# Patient Record
Sex: FEMALE | Race: WHITE | NOT HISPANIC OR LATINO
[De-identification: names, ages, dates, MRNs, and addresses within clinical notes are randomized per-mention and may not be internally consistent; named-entity substitution may affect disease eponyms.]

---

## 2019-04-19 ENCOUNTER — APPOINTMENT (OUTPATIENT)
Dept: ANTEPARTUM | Facility: CLINIC | Age: 29
End: 2019-04-19
Payer: COMMERCIAL

## 2019-04-19 PROBLEM — Z00.00 ENCOUNTER FOR PREVENTIVE HEALTH EXAMINATION: Status: ACTIVE | Noted: 2019-04-19

## 2019-04-19 PROCEDURE — 36415 COLL VENOUS BLD VENIPUNCTURE: CPT

## 2019-04-19 PROCEDURE — 76801 OB US < 14 WKS SINGLE FETUS: CPT

## 2019-04-19 PROCEDURE — 76813 OB US NUCHAL MEAS 1 GEST: CPT

## 2019-06-11 ENCOUNTER — APPOINTMENT (OUTPATIENT)
Dept: ANTEPARTUM | Facility: CLINIC | Age: 29
End: 2019-06-11
Payer: COMMERCIAL

## 2019-06-11 PROCEDURE — 76805 OB US >/= 14 WKS SNGL FETUS: CPT

## 2019-06-11 PROCEDURE — 76817 TRANSVAGINAL US OBSTETRIC: CPT

## 2019-09-03 ENCOUNTER — APPOINTMENT (OUTPATIENT)
Dept: ANTEPARTUM | Facility: CLINIC | Age: 29
End: 2019-09-03
Payer: COMMERCIAL

## 2019-09-03 PROCEDURE — 76819 FETAL BIOPHYS PROFIL W/O NST: CPT

## 2019-09-03 PROCEDURE — 76817 TRANSVAGINAL US OBSTETRIC: CPT

## 2019-09-03 PROCEDURE — 76816 OB US FOLLOW-UP PER FETUS: CPT

## 2019-10-30 ENCOUNTER — INPATIENT (INPATIENT)
Facility: HOSPITAL | Age: 29
LOS: 1 days | Discharge: HOME | End: 2019-11-01
Attending: OBSTETRICS & GYNECOLOGY | Admitting: OBSTETRICS & GYNECOLOGY
Payer: COMMERCIAL

## 2019-10-30 VITALS — WEIGHT: 152.12 LBS | HEIGHT: 66 IN

## 2019-10-30 LAB
ALBUMIN SERPL ELPH-MCNC: 2.8 G/DL — LOW (ref 3.3–5)
ALP SERPL-CCNC: 147 U/L — HIGH (ref 40–120)
ALT FLD-CCNC: 9 U/L — LOW (ref 10–45)
ANION GAP SERPL CALC-SCNC: 8 MMOL/L — SIGNIFICANT CHANGE UP (ref 5–17)
APTT BLD: 27.8 SEC — SIGNIFICANT CHANGE UP (ref 27.5–36.3)
AST SERPL-CCNC: 22 U/L — SIGNIFICANT CHANGE UP (ref 10–40)
BASOPHILS # BLD AUTO: 0.03 K/UL — SIGNIFICANT CHANGE UP (ref 0–0.2)
BASOPHILS NFR BLD AUTO: 0.3 % — SIGNIFICANT CHANGE UP (ref 0–2)
BILIRUB SERPL-MCNC: 0.5 MG/DL — SIGNIFICANT CHANGE UP (ref 0.2–1.2)
BLD GP AB SCN SERPL QL: NEGATIVE — SIGNIFICANT CHANGE UP
BUN SERPL-MCNC: 7 MG/DL — SIGNIFICANT CHANGE UP (ref 7–23)
CALCIUM SERPL-MCNC: 8.6 MG/DL — SIGNIFICANT CHANGE UP (ref 8.4–10.5)
CHLORIDE SERPL-SCNC: 109 MMOL/L — HIGH (ref 96–108)
CO2 SERPL-SCNC: 22 MMOL/L — SIGNIFICANT CHANGE UP (ref 22–31)
CREAT SERPL-MCNC: 0.55 MG/DL — SIGNIFICANT CHANGE UP (ref 0.5–1.3)
D DIMER BLD IA.RAPID-MCNC: 1843 NG/ML DDU — HIGH
EOSINOPHIL # BLD AUTO: 0.08 K/UL — SIGNIFICANT CHANGE UP (ref 0–0.5)
EOSINOPHIL NFR BLD AUTO: 0.8 % — SIGNIFICANT CHANGE UP (ref 0–6)
FIBRINOGEN PPP-MCNC: 249 MG/DL — LOW (ref 258–438)
GLUCOSE SERPL-MCNC: 76 MG/DL — SIGNIFICANT CHANGE UP (ref 70–99)
HCT VFR BLD CALC: 31.4 % — LOW (ref 34.5–45)
HCT VFR BLD CALC: 34.7 % — SIGNIFICANT CHANGE UP (ref 34.5–45)
HGB BLD-MCNC: 10.9 G/DL — LOW (ref 11.5–15.5)
HGB BLD-MCNC: 12.3 G/DL — SIGNIFICANT CHANGE UP (ref 11.5–15.5)
IMM GRANULOCYTES NFR BLD AUTO: 0.6 % — SIGNIFICANT CHANGE UP (ref 0–1.5)
INR BLD: 0.91 — SIGNIFICANT CHANGE UP (ref 0.88–1.16)
LYMPHOCYTES # BLD AUTO: 2.71 K/UL — SIGNIFICANT CHANGE UP (ref 1–3.3)
LYMPHOCYTES # BLD AUTO: 27.8 % — SIGNIFICANT CHANGE UP (ref 13–44)
MCHC RBC-ENTMCNC: 33.5 PG — SIGNIFICANT CHANGE UP (ref 27–34)
MCHC RBC-ENTMCNC: 33.7 PG — SIGNIFICANT CHANGE UP (ref 27–34)
MCHC RBC-ENTMCNC: 34.7 GM/DL — SIGNIFICANT CHANGE UP (ref 32–36)
MCHC RBC-ENTMCNC: 35.4 GM/DL — SIGNIFICANT CHANGE UP (ref 32–36)
MCV RBC AUTO: 94.6 FL — SIGNIFICANT CHANGE UP (ref 80–100)
MCV RBC AUTO: 97.2 FL — SIGNIFICANT CHANGE UP (ref 80–100)
MONOCYTES # BLD AUTO: 0.76 K/UL — SIGNIFICANT CHANGE UP (ref 0–0.9)
MONOCYTES NFR BLD AUTO: 7.8 % — SIGNIFICANT CHANGE UP (ref 2–14)
NEUTROPHILS # BLD AUTO: 6.1 K/UL — SIGNIFICANT CHANGE UP (ref 1.8–7.4)
NEUTROPHILS NFR BLD AUTO: 62.7 % — SIGNIFICANT CHANGE UP (ref 43–77)
NRBC # BLD: 0 /100 WBCS — SIGNIFICANT CHANGE UP (ref 0–0)
NRBC # BLD: 0 /100 WBCS — SIGNIFICANT CHANGE UP (ref 0–0)
PLATELET # BLD AUTO: 115 K/UL — LOW (ref 150–400)
PLATELET # BLD AUTO: 138 K/UL — LOW (ref 150–400)
POTASSIUM SERPL-MCNC: 3.8 MMOL/L — SIGNIFICANT CHANGE UP (ref 3.5–5.3)
POTASSIUM SERPL-SCNC: 3.8 MMOL/L — SIGNIFICANT CHANGE UP (ref 3.5–5.3)
PROT SERPL-MCNC: 4.9 G/DL — LOW (ref 6–8.3)
PROTHROM AB SERPL-ACNC: 10.2 SEC — SIGNIFICANT CHANGE UP (ref 10–12.9)
RBC # BLD: 3.23 M/UL — LOW (ref 3.8–5.2)
RBC # BLD: 3.67 M/UL — LOW (ref 3.8–5.2)
RBC # FLD: 12.4 % — SIGNIFICANT CHANGE UP (ref 10.3–14.5)
RBC # FLD: 12.6 % — SIGNIFICANT CHANGE UP (ref 10.3–14.5)
RH IG SCN BLD-IMP: POSITIVE — SIGNIFICANT CHANGE UP
RH IG SCN BLD-IMP: POSITIVE — SIGNIFICANT CHANGE UP
SODIUM SERPL-SCNC: 139 MMOL/L — SIGNIFICANT CHANGE UP (ref 135–145)
T PALLIDUM AB TITR SER: NEGATIVE — SIGNIFICANT CHANGE UP
WBC # BLD: 12.08 K/UL — HIGH (ref 3.8–10.5)
WBC # BLD: 9.74 K/UL — SIGNIFICANT CHANGE UP (ref 3.8–10.5)
WBC # FLD AUTO: 12.08 K/UL — HIGH (ref 3.8–10.5)
WBC # FLD AUTO: 9.74 K/UL — SIGNIFICANT CHANGE UP (ref 3.8–10.5)

## 2019-10-30 RX ORDER — ACETAMINOPHEN 500 MG
975 TABLET ORAL
Refills: 0 | Status: DISCONTINUED | OUTPATIENT
Start: 2019-10-30 | End: 2019-11-01

## 2019-10-30 RX ORDER — MORPHINE SULFATE 50 MG/1
4 CAPSULE, EXTENDED RELEASE ORAL ONCE
Refills: 0 | Status: DISCONTINUED | OUTPATIENT
Start: 2019-10-30 | End: 2019-10-30

## 2019-10-30 RX ORDER — SIMETHICONE 80 MG/1
80 TABLET, CHEWABLE ORAL EVERY 4 HOURS
Refills: 0 | Status: DISCONTINUED | OUTPATIENT
Start: 2019-10-30 | End: 2019-11-01

## 2019-10-30 RX ORDER — OXYTOCIN 10 UNIT/ML
333.33 VIAL (ML) INJECTION
Qty: 20 | Refills: 0 | Status: DISCONTINUED | OUTPATIENT
Start: 2019-10-30 | End: 2019-10-30

## 2019-10-30 RX ORDER — FENTANYL/BUPIVACAINE/NS/PF 2MCG/ML-.1
250 PLASTIC BAG, INJECTION (ML) INJECTION
Refills: 0 | Status: DISCONTINUED | OUTPATIENT
Start: 2019-10-30 | End: 2019-10-30

## 2019-10-30 RX ORDER — IBUPROFEN 200 MG
600 TABLET ORAL EVERY 6 HOURS
Refills: 0 | Status: DISCONTINUED | OUTPATIENT
Start: 2019-10-30 | End: 2019-11-01

## 2019-10-30 RX ORDER — PRAMOXINE HYDROCHLORIDE 150 MG/15G
1 AEROSOL, FOAM RECTAL EVERY 4 HOURS
Refills: 0 | Status: DISCONTINUED | OUTPATIENT
Start: 2019-10-30 | End: 2019-11-01

## 2019-10-30 RX ORDER — OXYTOCIN 10 UNIT/ML
333.33 VIAL (ML) INJECTION
Qty: 20 | Refills: 0 | Status: DISCONTINUED | OUTPATIENT
Start: 2019-10-30 | End: 2019-11-01

## 2019-10-30 RX ORDER — MAGNESIUM HYDROXIDE 400 MG/1
30 TABLET, CHEWABLE ORAL
Refills: 0 | Status: DISCONTINUED | OUTPATIENT
Start: 2019-10-30 | End: 2019-11-01

## 2019-10-30 RX ORDER — GLYCERIN ADULT
1 SUPPOSITORY, RECTAL RECTAL AT BEDTIME
Refills: 0 | Status: DISCONTINUED | OUTPATIENT
Start: 2019-10-30 | End: 2019-11-01

## 2019-10-30 RX ORDER — BENZOCAINE 10 %
1 GEL (GRAM) MUCOUS MEMBRANE EVERY 6 HOURS
Refills: 0 | Status: DISCONTINUED | OUTPATIENT
Start: 2019-10-30 | End: 2019-11-01

## 2019-10-30 RX ORDER — OXYCODONE HYDROCHLORIDE 5 MG/1
5 TABLET ORAL ONCE
Refills: 0 | Status: DISCONTINUED | OUTPATIENT
Start: 2019-10-30 | End: 2019-11-01

## 2019-10-30 RX ORDER — HYDROMORPHONE HYDROCHLORIDE 2 MG/ML
0.5 INJECTION INTRAMUSCULAR; INTRAVENOUS; SUBCUTANEOUS ONCE
Refills: 0 | Status: DISCONTINUED | OUTPATIENT
Start: 2019-10-30 | End: 2019-10-30

## 2019-10-30 RX ORDER — DIBUCAINE 1 %
1 OINTMENT (GRAM) RECTAL EVERY 6 HOURS
Refills: 0 | Status: DISCONTINUED | OUTPATIENT
Start: 2019-10-30 | End: 2019-11-01

## 2019-10-30 RX ORDER — SODIUM CHLORIDE 9 MG/ML
1000 INJECTION, SOLUTION INTRAVENOUS
Refills: 0 | Status: DISCONTINUED | OUTPATIENT
Start: 2019-10-30 | End: 2019-10-30

## 2019-10-30 RX ORDER — SODIUM CHLORIDE 9 MG/ML
3 INJECTION INTRAMUSCULAR; INTRAVENOUS; SUBCUTANEOUS EVERY 8 HOURS
Refills: 0 | Status: DISCONTINUED | OUTPATIENT
Start: 2019-10-30 | End: 2019-11-01

## 2019-10-30 RX ORDER — CARBOPROST TROMETHAMINE 250 UG/ML
250 INJECTION, SOLUTION INTRAMUSCULAR ONCE
Refills: 0 | Status: COMPLETED | OUTPATIENT
Start: 2019-10-30 | End: 2019-10-30

## 2019-10-30 RX ORDER — KETOROLAC TROMETHAMINE 30 MG/ML
30 SYRINGE (ML) INJECTION ONCE
Refills: 0 | Status: DISCONTINUED | OUTPATIENT
Start: 2019-10-30 | End: 2019-10-30

## 2019-10-30 RX ORDER — OXYCODONE HYDROCHLORIDE 5 MG/1
5 TABLET ORAL
Refills: 0 | Status: DISCONTINUED | OUTPATIENT
Start: 2019-10-30 | End: 2019-11-01

## 2019-10-30 RX ORDER — DIPHENHYDRAMINE HCL 50 MG
25 CAPSULE ORAL EVERY 6 HOURS
Refills: 0 | Status: DISCONTINUED | OUTPATIENT
Start: 2019-10-30 | End: 2019-11-01

## 2019-10-30 RX ORDER — LANOLIN
1 OINTMENT (GRAM) TOPICAL EVERY 6 HOURS
Refills: 0 | Status: DISCONTINUED | OUTPATIENT
Start: 2019-10-30 | End: 2019-11-01

## 2019-10-30 RX ORDER — AER TRAVELER 0.5 G/1
1 SOLUTION RECTAL; TOPICAL EVERY 4 HOURS
Refills: 0 | Status: DISCONTINUED | OUTPATIENT
Start: 2019-10-30 | End: 2019-11-01

## 2019-10-30 RX ORDER — IBUPROFEN 200 MG
600 TABLET ORAL EVERY 6 HOURS
Refills: 0 | Status: COMPLETED | OUTPATIENT
Start: 2019-10-30 | End: 2020-09-27

## 2019-10-30 RX ORDER — SODIUM CHLORIDE 9 MG/ML
1000 INJECTION, SOLUTION INTRAVENOUS ONCE
Refills: 0 | Status: COMPLETED | OUTPATIENT
Start: 2019-10-30 | End: 2019-10-30

## 2019-10-30 RX ORDER — TETANUS TOXOID, REDUCED DIPHTHERIA TOXOID AND ACELLULAR PERTUSSIS VACCINE, ADSORBED 5; 2.5; 8; 8; 2.5 [IU]/.5ML; [IU]/.5ML; UG/.5ML; UG/.5ML; UG/.5ML
0.5 SUSPENSION INTRAMUSCULAR ONCE
Refills: 0 | Status: DISCONTINUED | OUTPATIENT
Start: 2019-10-30 | End: 2019-11-01

## 2019-10-30 RX ORDER — HYDROCORTISONE 1 %
1 OINTMENT (GRAM) TOPICAL EVERY 6 HOURS
Refills: 0 | Status: DISCONTINUED | OUTPATIENT
Start: 2019-10-30 | End: 2019-11-01

## 2019-10-30 RX ORDER — CITRIC ACID/SODIUM CITRATE 300-500 MG
15 SOLUTION, ORAL ORAL EVERY 6 HOURS
Refills: 0 | Status: DISCONTINUED | OUTPATIENT
Start: 2019-10-30 | End: 2019-10-30

## 2019-10-30 RX ADMIN — Medication 1000 MILLIUNIT(S)/MIN: at 07:36

## 2019-10-30 RX ADMIN — HYDROMORPHONE HYDROCHLORIDE 0.5 MILLIGRAM(S): 2 INJECTION INTRAMUSCULAR; INTRAVENOUS; SUBCUTANEOUS at 17:21

## 2019-10-30 RX ADMIN — SODIUM CHLORIDE 125 MILLILITER(S): 9 INJECTION, SOLUTION INTRAVENOUS at 01:40

## 2019-10-30 RX ADMIN — SODIUM CHLORIDE 125 MILLILITER(S): 9 INJECTION, SOLUTION INTRAVENOUS at 02:08

## 2019-10-30 RX ADMIN — Medication 0.2 MILLIGRAM(S): at 14:35

## 2019-10-30 RX ADMIN — SODIUM CHLORIDE 1000 MILLILITER(S): 9 INJECTION, SOLUTION INTRAVENOUS at 15:18

## 2019-10-30 RX ADMIN — HYDROMORPHONE HYDROCHLORIDE 0.5 MILLIGRAM(S): 2 INJECTION INTRAMUSCULAR; INTRAVENOUS; SUBCUTANEOUS at 17:30

## 2019-10-30 RX ADMIN — MORPHINE SULFATE 4 MILLIGRAM(S): 50 CAPSULE, EXTENDED RELEASE ORAL at 16:24

## 2019-10-30 RX ADMIN — Medication 30 MILLIGRAM(S): at 08:39

## 2019-10-30 RX ADMIN — Medication 975 MILLIGRAM(S): at 05:54

## 2019-10-30 RX ADMIN — Medication 975 MILLIGRAM(S): at 10:40

## 2019-10-30 RX ADMIN — Medication 30 MILLIGRAM(S): at 07:36

## 2019-10-30 RX ADMIN — CARBOPROST TROMETHAMINE 250 MICROGRAM(S): 250 INJECTION, SOLUTION INTRAMUSCULAR at 15:56

## 2019-10-30 RX ADMIN — Medication 600 MILLIGRAM(S): at 14:11

## 2019-10-30 RX ADMIN — Medication 975 MILLIGRAM(S): at 21:45

## 2019-10-30 NOTE — PROGRESS NOTE ADULT - ASSESSMENT
A/P: 29y s/p , PPD0 with bleeding from laceration  - Erickson placed to drain bladder as uterus was deviated  - Spoke with Dr. Painter who would like to observe patient and re-assess in 1-2hrs  - Chief residents aware, will evaluate patient this morning

## 2019-10-30 NOTE — LACTATION INITIAL EVALUATION - INTERVENTION OUTCOME
verbalizes understanding/good return demonstration/demonstrates understanding of teaching/needs not met

## 2019-10-30 NOTE — PROVIDER CONTACT NOTE (CHANGE IN STATUS NOTIFICATION) - SITUATION
Patient noted to have bleeding, 100cc expressed with clots at this time.  PA call to bed side at this time to evaluate the patient.
Received care of patient from CORA Medellin RN.  PP as of 6:30am.
Requested resident come assess patient. Patient reports feeling gushes of blood and light headed. Patient is pale. HR 74 and BP of 118/80. SpO2 is 98%. RR 20. Patient appears pale. A+0x4
patient states feels light heading and "like she is going to pass out" and still feels gushes of blood. Requested MD come assess patient.

## 2019-10-30 NOTE — PROVIDER CONTACT NOTE (CHANGE IN STATUS NOTIFICATION) - ACTION/TREATMENT ORDERED:
Resident expressed clots. To order cytotec per rectum. Resident expressed 100 cc of clots. To order cytotec per rectum.

## 2019-10-30 NOTE — PROVIDER CONTACT NOTE (CHANGE IN STATUS NOTIFICATION) - ASSESSMENT
tricking of blood noted from laceration. bladder noted to be distended, rodriguez placed at this time.  Dr Paniter call and made aware of patient status.
Patient firm at umbilicus, moderate bleeding on expression.  Patient reports feeling lightheaded.  Vital signs stable.  Will continue to assess
Soaked pad. 1/2 anca is soaked.

## 2019-10-30 NOTE — PROGRESS NOTE ADULT - SUBJECTIVE AND OBJECTIVE BOX
Patient evaluated at bedside for postpartum bleeding.  Nurse states there seems to be a constant trickle from the laceration.  Patient feels a little lightheaded but otherwise without complaints.    Uncomplicated delivery with first degree laceration that was repaired.  She reports pain is well controlled.      Physical Exam:  T(C): 36.4 (10-30-19 @ 07:15), Max: 36.4 (10-30-19 @ 07:15)  HR: 74 (10-30-19 @ 08:00) (69 - 84)  BP: 121/79 (10-30-19 @ 08:00) (107/67 - 121/87)    General: resting comfortable in NAD  Respiratory: no increased work of breathing  Abdomen: soft, nontender, nondistended, no rebound or guarding, uterus firm and deviated to left, fundus at umbilicus  Genitourinary: bimanual performed, no clots expressed, lochia WNL; slight persistent bleeding from site of laceration  Extremities: no swelling or calf tenderness                          12.3   9.74  )-----------( 138      ( 30 Oct 2019 02:07 )             34.7 Patient evaluated at bedside for postpartum bleeding.  Nurse states there seems to be a constant trickle from the laceration.  Patient feels a little lightheaded but otherwise without complaints.    Uncomplicated delivery with first degree laceration that was repaired.  She reports pain is well controlled.      Physical Exam:  T(C): 36.4 (10-30-19 @ 07:15), Max: 36.4 (10-30-19 @ 07:15)  HR: 74 (10-30-19 @ 08:00) (69 - 84)  BP: 121/79 (10-30-19 @ 08:00) (107/67 - 121/87)    General: resting comfortable in NAD  Respiratory: no increased work of breathing  Abdomen: soft, nontender, nondistended, no rebound or guarding, uterus firm and deviated to left, fundus at umbilicus  Genitourinary: bimanual performed, no clots expressed, lochia WNL; persistent bleeding from site of laceration, ~100cc total  Extremities: no swelling or calf tenderness                          12.3   9.74  )-----------( 138      ( 30 Oct 2019 02:07 )             34.7

## 2019-10-30 NOTE — LACTATION INITIAL EVALUATION - NS LACT CON REASON FOR REQ
inverted/flat nipples inverted/flat nipples/Initial lactation consult on   vaginal delivery with postpartum complication of uterine bleeding Mother transferred to R for follow up. Mother seen in LDR due to bleeding complications mother delivered a male infant gestational age of 40.3.  Infant and mom seen at 11 hours after birth. Mother is motivated to breastfeed and stated she has had colostrum since 18 weeks gestation. Infant has voided x 1 and stooled x3. Mother’s concerned about latch due to flat and inverted nipples. Mother instructed on positioning infant to align infant with nose to nipple and maintain proper alignment to facilitate feeds. Infant placed on mother football hold and after several attempts and full assist at the breast infant was unable to sustain latch infant was seeking the breast and left to see if he would self-attach but was unsuccessful.  Manual expression with expressible colostrum noted, infant given 3cc of EBM. Verbal instructions given regarding frequency of feeds observing for feeding cues and feeding on demand 8-12 times in 24 hour period plus observing for output of urine and stool. Encouraged mother to ask for lactation assistance.  Follow up to be done.

## 2019-10-30 NOTE — PROGRESS NOTE ADULT - SUBJECTIVE AND OBJECTIVE BOX
Delay in note due to patient care    Patient evaluated at bedside for postpartum bleeding. She was previously expressed for 200cc clots and given dose of PO methergine. When this author presented to bedside bimanual massage performed however pt unable to tolerate. Another 150cc of clots expressed with more noted on chux pad. 1000mcg of cytotec given. She continued to have bleeding and dose of IM hemabate given. 500cc LR bolus given with 4mg morphine for pain relief. Plan to move to labor and delivery for closer monitoring.     Dr. Painter aware.

## 2019-10-31 LAB
HCT VFR BLD CALC: 24.3 % — LOW (ref 34.5–45)
HCT VFR BLD CALC: 27.3 % — LOW (ref 34.5–45)
HGB BLD-MCNC: 8.3 G/DL — LOW (ref 11.5–15.5)
HGB BLD-MCNC: 9.1 G/DL — LOW (ref 11.5–15.5)
MCHC RBC-ENTMCNC: 33.3 GM/DL — SIGNIFICANT CHANGE UP (ref 32–36)
MCHC RBC-ENTMCNC: 33.5 PG — SIGNIFICANT CHANGE UP (ref 27–34)
MCHC RBC-ENTMCNC: 34 PG — SIGNIFICANT CHANGE UP (ref 27–34)
MCHC RBC-ENTMCNC: 34.2 GM/DL — SIGNIFICANT CHANGE UP (ref 32–36)
MCV RBC AUTO: 100.4 FL — HIGH (ref 80–100)
MCV RBC AUTO: 99.6 FL — SIGNIFICANT CHANGE UP (ref 80–100)
NRBC # BLD: 0 /100 WBCS — SIGNIFICANT CHANGE UP (ref 0–0)
NRBC # BLD: 0 /100 WBCS — SIGNIFICANT CHANGE UP (ref 0–0)
PLATELET # BLD AUTO: 105 K/UL — LOW (ref 150–400)
PLATELET # BLD AUTO: 112 K/UL — LOW (ref 150–400)
RBC # BLD: 2.44 M/UL — LOW (ref 3.8–5.2)
RBC # BLD: 2.72 M/UL — LOW (ref 3.8–5.2)
RBC # FLD: 13 % — SIGNIFICANT CHANGE UP (ref 10.3–14.5)
RBC # FLD: 13.1 % — SIGNIFICANT CHANGE UP (ref 10.3–14.5)
WBC # BLD: 9.02 K/UL — SIGNIFICANT CHANGE UP (ref 3.8–10.5)
WBC # BLD: 9.14 K/UL — SIGNIFICANT CHANGE UP (ref 3.8–10.5)
WBC # FLD AUTO: 9.02 K/UL — SIGNIFICANT CHANGE UP (ref 3.8–10.5)
WBC # FLD AUTO: 9.14 K/UL — SIGNIFICANT CHANGE UP (ref 3.8–10.5)

## 2019-10-31 RX ADMIN — Medication 600 MILLIGRAM(S): at 07:53

## 2019-10-31 RX ADMIN — SODIUM CHLORIDE 3 MILLILITER(S): 9 INJECTION INTRAMUSCULAR; INTRAVENOUS; SUBCUTANEOUS at 06:08

## 2019-10-31 RX ADMIN — SIMETHICONE 80 MILLIGRAM(S): 80 TABLET, CHEWABLE ORAL at 22:38

## 2019-10-31 RX ADMIN — Medication 975 MILLIGRAM(S): at 17:57

## 2019-10-31 RX ADMIN — Medication 975 MILLIGRAM(S): at 23:10

## 2019-10-31 RX ADMIN — Medication 0.2 MILLIGRAM(S): at 06:07

## 2019-10-31 RX ADMIN — SIMETHICONE 80 MILLIGRAM(S): 80 TABLET, CHEWABLE ORAL at 17:00

## 2019-10-31 RX ADMIN — Medication 975 MILLIGRAM(S): at 10:51

## 2019-10-31 RX ADMIN — Medication 1 APPLICATION(S): at 22:39

## 2019-10-31 RX ADMIN — Medication 600 MILLIGRAM(S): at 13:27

## 2019-10-31 RX ADMIN — Medication 975 MILLIGRAM(S): at 16:57

## 2019-10-31 RX ADMIN — Medication 975 MILLIGRAM(S): at 09:51

## 2019-10-31 RX ADMIN — Medication 1 APPLICATION(S): at 01:36

## 2019-10-31 RX ADMIN — Medication 975 MILLIGRAM(S): at 22:37

## 2019-10-31 RX ADMIN — SODIUM CHLORIDE 3 MILLILITER(S): 9 INJECTION INTRAMUSCULAR; INTRAVENOUS; SUBCUTANEOUS at 23:11

## 2019-10-31 RX ADMIN — Medication 1 TABLET(S): at 12:27

## 2019-10-31 RX ADMIN — Medication 600 MILLIGRAM(S): at 12:27

## 2019-10-31 RX ADMIN — SODIUM CHLORIDE 3 MILLILITER(S): 9 INJECTION INTRAMUSCULAR; INTRAVENOUS; SUBCUTANEOUS at 14:30

## 2019-10-31 RX ADMIN — Medication 600 MILLIGRAM(S): at 06:05

## 2019-10-31 RX ADMIN — Medication 600 MILLIGRAM(S): at 20:10

## 2019-10-31 RX ADMIN — Medication 600 MILLIGRAM(S): at 19:47

## 2019-10-31 RX ADMIN — Medication 600 MILLIGRAM(S): at 00:47

## 2019-10-31 RX ADMIN — Medication 0.2 MILLIGRAM(S): at 01:39

## 2019-10-31 NOTE — PROGRESS NOTE ADULT - ASSESSMENT
A/P 29y s/p , PPD # 1 , stable, meeting postpartum milestones   - Pain: well controlled on tylenol and motrin  - GI: Tolerating regular diet, colace PRN  - : urinating without difficulty/pain  -DVT prophylaxis: ambulating frequently  -Dispo: PPD 2, unless otherwise specified

## 2019-10-31 NOTE — PROGRESS NOTE ADULT - SUBJECTIVE AND OBJECTIVE BOX
Patient evaluated at bedside this morning, resting comfortable in bed, no acute events overnight.  She reports pain is well controlled with tylenol and motrin  She denies headache, dizziness, chest pain, palpitations, shortness of breath, nausea, vomiting, heavy vaginal bleeding or perineal discomfort. Reports decrease in amount of vaginal bleeding and denies clots.  She has been ambulating without assistance, voiding spontaneously, and is breastfeeding.   Tolerating food well, without nausea/vomit.  Passing flatus.     Physical Exam:  T(C): 36.7 (10-31-19 @ 01:25), Max: 36.7 (10-31-19 @ 00:00)  HR: 82 (10-31-19 @ 01:25) (71 - 86)  BP: 121/83 (10-31-19 @ 01:25) (118/71 - 129/63)  RR: 18 (10-31-19 @ 01:25) (17 - 18)  SpO2: 100% (10-31-19 @ 01:25) (100% - 100%)    GA: NAD, A&O x 3  CV: RRR, no murmurs, rubs, or gallops  Pulm: clear breath sounds throughout, no rales, rhonchi, wheezes  Abd: + BS, soft, nontender, nondistended, no rebound or guarding, uterus firm at midline and 1 fb below umbilicus  Perineum: normal lochia, intact, healing well, no hematoma  Extremities: no swelling or calf tenderness                          10.9   12.08 )-----------( 115      ( 30 Oct 2019 16:18 )             31.4     10-30    139  |  109<H>  |  7   ----------------------------<  76  3.8   |  22  |  0.55    Ca    8.6      30 Oct 2019 18:03    TPro  4.9<L>  /  Alb  2.8<L>  /  TBili  0.5  /  DBili  x   /  AST  22  /  ALT  9<L>  /  AlkPhos  147<H>  10-30    acetaminophen   Tablet .. 975 milliGRAM(s) Oral <User Schedule>  benzocaine 20%/menthol 0.5% Spray 1 Spray(s) Topical every 6 hours PRN  dibucaine 1% Ointment 1 Application(s) Topical every 6 hours PRN  diphenhydrAMINE 25 milliGRAM(s) Oral every 6 hours PRN  diphtheria/tetanus/pertussis (acellular) Vaccine (ADAcel) 0.5 milliLiter(s) IntraMuscular once  glycerin Suppository - Adult 1 Suppository(s) Rectal at bedtime PRN  hydrocortisone 1% Cream 1 Application(s) Topical every 6 hours PRN  ibuprofen  Tablet. 600 milliGRAM(s) Oral every 6 hours  lanolin Ointment 1 Application(s) Topical every 6 hours PRN  magnesium hydroxide Suspension 30 milliLiter(s) Oral two times a day PRN  methylergonovine 0.2 milliGRAM(s) Oral every 6 hours  oxyCODONE    IR 5 milliGRAM(s) Oral every 3 hours PRN  oxyCODONE    IR 5 milliGRAM(s) Oral once PRN  oxytocin Infusion 333.333 milliUNIT(s)/Min IV Continuous <Continuous>  pramoxine 1%/zinc 5% Cream 1 Application(s) Topical every 4 hours PRN  prenatal multivitamin 1 Tablet(s) Oral daily  simethicone 80 milliGRAM(s) Chew every 4 hours PRN  sodium chloride 0.9% lock flush 3 milliLiter(s) IV Push every 8 hours  witch hazel Pads 1 Application(s) Topical every 4 hours PRN

## 2019-11-01 ENCOUNTER — TRANSCRIPTION ENCOUNTER (OUTPATIENT)
Age: 29
End: 2019-11-01

## 2019-11-01 VITALS
RESPIRATION RATE: 18 BRPM | OXYGEN SATURATION: 98 % | SYSTOLIC BLOOD PRESSURE: 103 MMHG | HEART RATE: 93 BPM | TEMPERATURE: 98 F | DIASTOLIC BLOOD PRESSURE: 71 MMHG

## 2019-11-01 LAB
EXTRA GREEN TOP TUBE: SIGNIFICANT CHANGE UP
HCT VFR BLD CALC: 24.1 % — LOW (ref 34.5–45)
HGB BLD-MCNC: 8.2 G/DL — LOW (ref 11.5–15.5)
MCHC RBC-ENTMCNC: 34 GM/DL — SIGNIFICANT CHANGE UP (ref 32–36)
MCHC RBC-ENTMCNC: 34 PG — SIGNIFICANT CHANGE UP (ref 27–34)
MCV RBC AUTO: 100 FL — SIGNIFICANT CHANGE UP (ref 80–100)
NRBC # BLD: 0 /100 WBCS — SIGNIFICANT CHANGE UP (ref 0–0)
PLATELET # BLD AUTO: 121 K/UL — LOW (ref 150–400)
RBC # BLD: 2.41 M/UL — LOW (ref 3.8–5.2)
RBC # FLD: 13.2 % — SIGNIFICANT CHANGE UP (ref 10.3–14.5)
WBC # BLD: 7.85 K/UL — SIGNIFICANT CHANGE UP (ref 3.8–10.5)
WBC # FLD AUTO: 7.85 K/UL — SIGNIFICANT CHANGE UP (ref 3.8–10.5)

## 2019-11-01 PROCEDURE — 86901 BLOOD TYPING SEROLOGIC RH(D): CPT

## 2019-11-01 PROCEDURE — 85025 COMPLETE CBC W/AUTO DIFF WBC: CPT

## 2019-11-01 PROCEDURE — 85027 COMPLETE CBC AUTOMATED: CPT

## 2019-11-01 PROCEDURE — 85384 FIBRINOGEN ACTIVITY: CPT

## 2019-11-01 PROCEDURE — 36415 COLL VENOUS BLD VENIPUNCTURE: CPT

## 2019-11-01 PROCEDURE — 86900 BLOOD TYPING SEROLOGIC ABO: CPT

## 2019-11-01 PROCEDURE — 80053 COMPREHEN METABOLIC PANEL: CPT

## 2019-11-01 PROCEDURE — 85379 FIBRIN DEGRADATION QUANT: CPT

## 2019-11-01 PROCEDURE — 86780 TREPONEMA PALLIDUM: CPT

## 2019-11-01 PROCEDURE — 86850 RBC ANTIBODY SCREEN: CPT

## 2019-11-01 PROCEDURE — 85730 THROMBOPLASTIN TIME PARTIAL: CPT

## 2019-11-01 PROCEDURE — 85610 PROTHROMBIN TIME: CPT

## 2019-11-01 RX ADMIN — Medication 975 MILLIGRAM(S): at 10:00

## 2019-11-01 RX ADMIN — Medication 600 MILLIGRAM(S): at 00:14

## 2019-11-01 RX ADMIN — Medication 600 MILLIGRAM(S): at 06:27

## 2019-11-01 RX ADMIN — Medication 975 MILLIGRAM(S): at 09:43

## 2019-11-01 RX ADMIN — Medication 600 MILLIGRAM(S): at 01:02

## 2019-11-01 RX ADMIN — SODIUM CHLORIDE 3 MILLILITER(S): 9 INJECTION INTRAMUSCULAR; INTRAVENOUS; SUBCUTANEOUS at 06:14

## 2019-11-01 RX ADMIN — Medication 1 TABLET(S): at 12:45

## 2019-11-01 RX ADMIN — Medication 600 MILLIGRAM(S): at 12:46

## 2019-11-01 NOTE — DISCHARGE NOTE OB - PLAN OF CARE
Feel Well Take Motrin 600mg every 6 hours and/or Tylenol 650mg every 6 hours as needed for pain. Call your OBGYN to schedule a follow up appointment in 4-6weeks, at which time you can discuss postpartum contraception.     Call your OBGYN if you experience severe abdominal pain not improved by oral pain medications, heavy bright red vaginal bleeding saturating more than 1 pad per hour, or fever greater than 100.4F.      Nothing in vagina (sex, tampons, or douching) for 6 weeks.

## 2019-11-01 NOTE — DISCHARGE NOTE OB - CARE PLAN
Principal Discharge DX:	Postpartum state  Goal:	Feel Well  Assessment and plan of treatment:	Take Motrin 600mg every 6 hours and/or Tylenol 650mg every 6 hours as needed for pain. Call your OBGYN to schedule a follow up appointment in 4-6weeks, at which time you can discuss postpartum contraception.     Call your OBGYN if you experience severe abdominal pain not improved by oral pain medications, heavy bright red vaginal bleeding saturating more than 1 pad per hour, or fever greater than 100.4F.      Nothing in vagina (sex, tampons, or douching) for 6 weeks.

## 2019-11-01 NOTE — DISCHARGE NOTE OB - MATERIALS PROVIDED
Breastfeeding Log/Shaken Baby Prevention Handout/Breastfeeding Guide and Packet/[facility] Hearing Screen Program/Birth Certificate Instructions/  Immunization Record/Guide to Postpartum Care

## 2019-11-01 NOTE — DISCHARGE NOTE OB - CARE PROVIDER_API CALL
Everton Painter)  Obstetrics and Gynecology  [address] [place] [address]  Phone: [phone]  Fax: [phone]  Follow Up Time:

## 2019-11-01 NOTE — DISCHARGE NOTE OB - PATIENT PORTAL LINK FT
You can access the FollowMyHealth Patient Portal offered by [facility] by registering at the following website: [email or web address]. By joining [facility]’s FollowMyHealth portal, you will also be able to view your health information using other applications (apps) compatible with our system.

## 2019-11-01 NOTE — PROGRESS NOTE ADULT - SUBJECTIVE AND OBJECTIVE BOX
Patient evaluated at bedside this morning, resting comfortable in bed, no acute events overnight.  She reports pain is well controlled with tylenol and motrin  She denies headache, dizziness, chest pain, palpitations, shortness of breath, nausea, vomiting, heavy vaginal bleeding or perineal discomfort. Reports decrease in amount of vaginal bleeding and denies clots.  She has been ambulating without assistance, voiding spontaneously, and is breastfeeding.   Tolerating food well, without nausea/vomit.  Passing flatus.     Physical Exam:  T(C): 36.6 (11-01-19 @ 02:00), Max: 36.7 (10-31-19 @ 22:11)  HR: 86 (11-01-19 @ 02:00) (86 - 90)  BP: 109/70 (11-01-19 @ 02:00) (109/70 - 114/74)  RR: 18 (11-01-19 @ 02:00) (18 - 18)  SpO2: 97% (11-01-19 @ 02:00) (96% - 97%)    GA: NAD, A&O x 3  CV: RRR, no murmurs, rubs, or gallops  Pulm: clear breath sounds throughout, no rales, rhonchi, wheezes  Abd: + BS, soft, nontender, nondistended, no rebound or guarding, uterus firm at midline and 2  fb below umbilicus  Perineum: normal lochia, intact, healing well, no hematoma  Extremities: no swelling or calf tenderness                          8.3    9.02  )-----------( 112      ( 31 Oct 2019 16:05 )             24.3     10-30    139  |  109<H>  |  7   ----------------------------<  76  3.8   |  22  |  0.55    Ca    8.6      30 Oct 2019 18:03    TPro  4.9<L>  /  Alb  2.8<L>  /  TBili  0.5  /  DBili  x   /  AST  22  /  ALT  9<L>  /  AlkPhos  147<H>  10-30    acetaminophen   Tablet .. 975 milliGRAM(s) Oral <User Schedule>  benzocaine 20%/menthol 0.5% Spray 1 Spray(s) Topical every 6 hours PRN  dibucaine 1% Ointment 1 Application(s) Topical every 6 hours PRN  diphenhydrAMINE 25 milliGRAM(s) Oral every 6 hours PRN  diphtheria/tetanus/pertussis (acellular) Vaccine (ADAcel) 0.5 milliLiter(s) IntraMuscular once  glycerin Suppository - Adult 1 Suppository(s) Rectal at bedtime PRN  hydrocortisone 1% Cream 1 Application(s) Topical every 6 hours PRN  ibuprofen  Tablet. 600 milliGRAM(s) Oral every 6 hours  lanolin Ointment 1 Application(s) Topical every 6 hours PRN  magnesium hydroxide Suspension 30 milliLiter(s) Oral two times a day PRN  methylergonovine 0.2 milliGRAM(s) Oral every 6 hours  oxyCODONE    IR 5 milliGRAM(s) Oral every 3 hours PRN  oxyCODONE    IR 5 milliGRAM(s) Oral once PRN  oxytocin Infusion 333.333 milliUNIT(s)/Min IV Continuous <Continuous>  pramoxine 1%/zinc 5% Cream 1 Application(s) Topical every 4 hours PRN  prenatal multivitamin 1 Tablet(s) Oral daily  simethicone 80 milliGRAM(s) Chew every 4 hours PRN  sodium chloride 0.9% lock flush 3 milliLiter(s) IV Push every 8 hours  witch hazel Pads 1 Application(s) Topical every 4 hours PRN

## 2019-11-01 NOTE — DISCHARGE NOTE OB - HOSPITAL COURSE
Pt had a normal spontaneous vaginal delivery at 39 weeks gestation. Postpartum course complicated by hemorrhage secondary to lower segment atony. Pt received methergine, cytotec, and hemabate, with resolution of hemorrhage. She received a methergine series for 24hrs per protocol. Pt is stable at time of discharge. Will follow-up with primary ob.

## 2019-11-01 NOTE — PROGRESS NOTE ADULT - ASSESSMENT
A/P 29y s/p , PPD # 2 , stable, meeting postpartum milestones   - Pain: well controlled on tylenol and motrin  - GI: Tolerating regular diet, colace PRN  - : urinating without difficulty/pain  -DVT prophylaxis: ambulating frequently  -Dispo: discharge today, PPD 2, unless otherwise specified

## 2019-11-05 DIAGNOSIS — Z98.890 OTHER SPECIFIED POSTPROCEDURAL STATES: ICD-10-CM

## 2019-11-05 DIAGNOSIS — Z88.1 ALLERGY STATUS TO OTHER ANTIBIOTIC AGENTS STATUS: ICD-10-CM

## 2019-11-05 DIAGNOSIS — Z34.03 ENCOUNTER FOR SUPERVISION OF NORMAL FIRST PREGNANCY, THIRD TRIMESTER: ICD-10-CM

## 2019-11-05 DIAGNOSIS — Z88.8 ALLERGY STATUS TO OTHER DRUGS, MEDICAMENTS AND BIOLOGICAL SUBSTANCES STATUS: ICD-10-CM

## 2019-11-05 DIAGNOSIS — Z3A.40 40 WEEKS GESTATION OF PREGNANCY: ICD-10-CM

## 2021-05-14 ENCOUNTER — APPOINTMENT (OUTPATIENT)
Dept: ANTEPARTUM | Facility: CLINIC | Age: 31
End: 2021-05-14
Payer: COMMERCIAL

## 2021-05-14 ENCOUNTER — ASOB RESULT (OUTPATIENT)
Age: 31
End: 2021-05-14

## 2021-05-14 PROCEDURE — 76801 OB US < 14 WKS SINGLE FETUS: CPT

## 2021-05-14 PROCEDURE — 76813 OB US NUCHAL MEAS 1 GEST: CPT

## 2021-05-14 PROCEDURE — 99072 ADDL SUPL MATRL&STAF TM PHE: CPT

## 2021-07-12 ENCOUNTER — APPOINTMENT (OUTPATIENT)
Dept: ANTEPARTUM | Facility: CLINIC | Age: 31
End: 2021-07-12
Payer: COMMERCIAL

## 2021-07-12 ENCOUNTER — ASOB RESULT (OUTPATIENT)
Age: 31
End: 2021-07-12

## 2021-07-12 PROCEDURE — 76817 TRANSVAGINAL US OBSTETRIC: CPT

## 2021-07-12 PROCEDURE — 76805 OB US >/= 14 WKS SNGL FETUS: CPT

## 2021-07-12 PROCEDURE — 99072 ADDL SUPL MATRL&STAF TM PHE: CPT

## 2021-09-19 NOTE — DISCHARGE NOTE OB - PAIN IN THE CALVES OF YOUR LEGS
Juan R Uriostegui was seen and treated in our emergency department on 9/19/2021.  She may return to work on 09/20/2021.       If you have any questions or concerns, please don't hesitate to call.      Sanjeev Arango MD
Juan R Uriostegui was seen and treated in our emergency department on 9/19/2021.  She may return to work on 09/20/2021.       If you have any questions or concerns, please don't hesitate to call.      Sanjeev Arango MD
Statement Selected

## 2021-12-02 ENCOUNTER — INPATIENT (INPATIENT)
Facility: HOSPITAL | Age: 31
LOS: 1 days | Discharge: HOME | End: 2021-12-04
Attending: OBSTETRICS & GYNECOLOGY | Admitting: OBSTETRICS & GYNECOLOGY
Payer: COMMERCIAL

## 2021-12-02 VITALS — HEIGHT: 66 IN | WEIGHT: 160.06 LBS

## 2021-12-02 LAB
BASOPHILS # BLD AUTO: 0.02 K/UL — SIGNIFICANT CHANGE UP (ref 0–0.2)
BASOPHILS NFR BLD AUTO: 0.2 % — SIGNIFICANT CHANGE UP (ref 0–2)
BLD GP AB SCN SERPL QL: NEGATIVE — SIGNIFICANT CHANGE UP
EOSINOPHIL # BLD AUTO: 0.03 K/UL — SIGNIFICANT CHANGE UP (ref 0–0.5)
EOSINOPHIL NFR BLD AUTO: 0.3 % — SIGNIFICANT CHANGE UP (ref 0–6)
HCT VFR BLD CALC: 36.8 % — SIGNIFICANT CHANGE UP (ref 34.5–45)
HGB BLD-MCNC: 12.1 G/DL — SIGNIFICANT CHANGE UP (ref 11.5–15.5)
IMM GRANULOCYTES NFR BLD AUTO: 0.5 % — SIGNIFICANT CHANGE UP (ref 0–1.5)
LYMPHOCYTES # BLD AUTO: 1.91 K/UL — SIGNIFICANT CHANGE UP (ref 1–3.3)
LYMPHOCYTES # BLD AUTO: 20 % — SIGNIFICANT CHANGE UP (ref 13–44)
MCHC RBC-ENTMCNC: 31.2 PG — SIGNIFICANT CHANGE UP (ref 27–34)
MCHC RBC-ENTMCNC: 32.9 GM/DL — SIGNIFICANT CHANGE UP (ref 32–36)
MCV RBC AUTO: 94.8 FL — SIGNIFICANT CHANGE UP (ref 80–100)
MONOCYTES # BLD AUTO: 0.75 K/UL — SIGNIFICANT CHANGE UP (ref 0–0.9)
MONOCYTES NFR BLD AUTO: 7.8 % — SIGNIFICANT CHANGE UP (ref 2–14)
NEUTROPHILS # BLD AUTO: 6.81 K/UL — SIGNIFICANT CHANGE UP (ref 1.8–7.4)
NEUTROPHILS NFR BLD AUTO: 71.2 % — SIGNIFICANT CHANGE UP (ref 43–77)
NRBC # BLD: 0 /100 WBCS — SIGNIFICANT CHANGE UP (ref 0–0)
PLATELET # BLD AUTO: 141 K/UL — LOW (ref 150–400)
RBC # BLD: 3.88 M/UL — SIGNIFICANT CHANGE UP (ref 3.8–5.2)
RBC # FLD: 13 % — SIGNIFICANT CHANGE UP (ref 10.3–14.5)
RH IG SCN BLD-IMP: POSITIVE — SIGNIFICANT CHANGE UP
RH IG SCN BLD-IMP: POSITIVE — SIGNIFICANT CHANGE UP
WBC # BLD: 9.57 K/UL — SIGNIFICANT CHANGE UP (ref 3.8–10.5)
WBC # FLD AUTO: 9.57 K/UL — SIGNIFICANT CHANGE UP (ref 3.8–10.5)

## 2021-12-02 RX ORDER — SODIUM CHLORIDE 9 MG/ML
3 INJECTION INTRAMUSCULAR; INTRAVENOUS; SUBCUTANEOUS EVERY 8 HOURS
Refills: 0 | Status: DISCONTINUED | OUTPATIENT
Start: 2021-12-02 | End: 2021-12-04

## 2021-12-02 RX ORDER — DIPHENHYDRAMINE HCL 50 MG
25 CAPSULE ORAL EVERY 6 HOURS
Refills: 0 | Status: DISCONTINUED | OUTPATIENT
Start: 2021-12-02 | End: 2021-12-04

## 2021-12-02 RX ORDER — SODIUM CHLORIDE 9 MG/ML
1000 INJECTION, SOLUTION INTRAVENOUS
Refills: 0 | Status: DISCONTINUED | OUTPATIENT
Start: 2021-12-02 | End: 2021-12-02

## 2021-12-02 RX ORDER — BENZOCAINE 10 %
1 GEL (GRAM) MUCOUS MEMBRANE EVERY 6 HOURS
Refills: 0 | Status: DISCONTINUED | OUTPATIENT
Start: 2021-12-02 | End: 2021-12-04

## 2021-12-02 RX ORDER — SIMETHICONE 80 MG/1
80 TABLET, CHEWABLE ORAL EVERY 4 HOURS
Refills: 0 | Status: DISCONTINUED | OUTPATIENT
Start: 2021-12-02 | End: 2021-12-04

## 2021-12-02 RX ORDER — OXYTOCIN 10 UNIT/ML
333.33 VIAL (ML) INJECTION
Qty: 20 | Refills: 0 | Status: DISCONTINUED | OUTPATIENT
Start: 2021-12-02 | End: 2021-12-02

## 2021-12-02 RX ORDER — LANOLIN
1 OINTMENT (GRAM) TOPICAL EVERY 6 HOURS
Refills: 0 | Status: DISCONTINUED | OUTPATIENT
Start: 2021-12-02 | End: 2021-12-04

## 2021-12-02 RX ORDER — DIBUCAINE 1 %
1 OINTMENT (GRAM) RECTAL EVERY 6 HOURS
Refills: 0 | Status: DISCONTINUED | OUTPATIENT
Start: 2021-12-02 | End: 2021-12-04

## 2021-12-02 RX ORDER — TRANEXAMIC ACID 100 MG/ML
1000 INJECTION, SOLUTION INTRAVENOUS ONCE
Refills: 0 | Status: COMPLETED | OUTPATIENT
Start: 2021-12-02 | End: 2021-12-02

## 2021-12-02 RX ORDER — KETOROLAC TROMETHAMINE 30 MG/ML
30 SYRINGE (ML) INJECTION ONCE
Refills: 0 | Status: DISCONTINUED | OUTPATIENT
Start: 2021-12-02 | End: 2021-12-02

## 2021-12-02 RX ORDER — FENTANYL/BUPIVACAINE/NS/PF 2MCG/ML-.1
250 PLASTIC BAG, INJECTION (ML) INJECTION
Refills: 0 | Status: DISCONTINUED | OUTPATIENT
Start: 2021-12-02 | End: 2021-12-02

## 2021-12-02 RX ORDER — ACETAMINOPHEN 500 MG
975 TABLET ORAL
Refills: 0 | Status: DISCONTINUED | OUTPATIENT
Start: 2021-12-02 | End: 2021-12-04

## 2021-12-02 RX ORDER — PRAMOXINE HYDROCHLORIDE 150 MG/15G
1 AEROSOL, FOAM RECTAL EVERY 4 HOURS
Refills: 0 | Status: DISCONTINUED | OUTPATIENT
Start: 2021-12-02 | End: 2021-12-04

## 2021-12-02 RX ORDER — IBUPROFEN 200 MG
600 TABLET ORAL EVERY 6 HOURS
Refills: 0 | Status: COMPLETED | OUTPATIENT
Start: 2021-12-02 | End: 2022-10-31

## 2021-12-02 RX ORDER — MAGNESIUM HYDROXIDE 400 MG/1
30 TABLET, CHEWABLE ORAL
Refills: 0 | Status: DISCONTINUED | OUTPATIENT
Start: 2021-12-02 | End: 2021-12-04

## 2021-12-02 RX ORDER — AER TRAVELER 0.5 G/1
1 SOLUTION RECTAL; TOPICAL EVERY 4 HOURS
Refills: 0 | Status: DISCONTINUED | OUTPATIENT
Start: 2021-12-02 | End: 2021-12-04

## 2021-12-02 RX ORDER — HYDROCORTISONE 1 %
1 OINTMENT (GRAM) TOPICAL EVERY 6 HOURS
Refills: 0 | Status: DISCONTINUED | OUTPATIENT
Start: 2021-12-02 | End: 2021-12-04

## 2021-12-02 RX ORDER — OXYTOCIN 10 UNIT/ML
333.33 VIAL (ML) INJECTION
Qty: 20 | Refills: 0 | Status: DISCONTINUED | OUTPATIENT
Start: 2021-12-02 | End: 2021-12-04

## 2021-12-02 RX ORDER — TETANUS TOXOID, REDUCED DIPHTHERIA TOXOID AND ACELLULAR PERTUSSIS VACCINE, ADSORBED 5; 2.5; 8; 8; 2.5 [IU]/.5ML; [IU]/.5ML; UG/.5ML; UG/.5ML; UG/.5ML
0.5 SUSPENSION INTRAMUSCULAR ONCE
Refills: 0 | Status: DISCONTINUED | OUTPATIENT
Start: 2021-12-02 | End: 2021-12-04

## 2021-12-02 RX ORDER — CITRIC ACID/SODIUM CITRATE 300-500 MG
15 SOLUTION, ORAL ORAL EVERY 6 HOURS
Refills: 0 | Status: DISCONTINUED | OUTPATIENT
Start: 2021-12-02 | End: 2021-12-02

## 2021-12-02 RX ADMIN — SODIUM CHLORIDE 3 MILLILITER(S): 9 INJECTION INTRAMUSCULAR; INTRAVENOUS; SUBCUTANEOUS at 23:00

## 2021-12-02 RX ADMIN — Medication 0.2 MILLIGRAM(S): at 23:15

## 2021-12-02 RX ADMIN — Medication 30 MILLIGRAM(S): at 22:45

## 2021-12-02 RX ADMIN — Medication 30 MILLIGRAM(S): at 21:54

## 2021-12-02 RX ADMIN — TRANEXAMIC ACID 220 MILLIGRAM(S): 100 INJECTION, SOLUTION INTRAVENOUS at 22:55

## 2021-12-02 RX ADMIN — Medication 1000 MILLIUNIT(S)/MIN: at 20:56

## 2021-12-02 NOTE — PATIENT PROFILE OB - PRO FEEDING PLAN INFANT OB
Subjective:       Patient ID: Maggy Tapia is a 70 y.o. female.    Chief Complaint: Shortness of Breath    Ms. Tapia presents today for a 3 month chronic conditions F/U and lab review. She is here today with her spouse. Went to ED on 4/3 for SOB on exertion. Labs and EKG completed. Was discharged home after ruling out an acute cause. She reports that she has been SOB with exertion for about 7 months. First noticed it when she was playing bunko requiring her to switch chairs at a fast pace. This was physically demanding for her. Does not occur at rest. Denies chest pain, leg swelling, palpitation, chest tightness or wheezing. Also SOB at night or if she is lying down. Has JUAN- has not been compliant with CPAP, only recently started using every night. Has two birds and a dog in her home. No smoking within the home. Neurologist suggested PT. Recent labs reviewed in detail. Admits that she is forgets to take synthroid and is taking with other medications if she remembers.     Patient Active Problem List   Diagnosis    Syncope and collapse    Frequent falls (possibly related to polypharmacy)    Type 2 diabetes mellitus    History of left breast cancer    History of ischemic left MCA stroke    Seizure disorder    Essential hypertension    Hyperlipidemia    Thrombocytopenia    Depression    Hypothyroidism    Subdural hematoma, chronic, small, bilateral    SDH (subdural hematoma)    Valproic acid toxicity    Pseudoseizures    Severe obesity (BMI 35.0-35.9 with comorbidity)    Osteopenia    Nontraumatic subcortical hemorrhage of left cerebral hemisphere    JUAN (obstructive sleep apnea)    Near syncope    Diplopia    Cervical strain    Left homonymous hemianopsia    Tardive dyskinesia     Review of Systems   Constitutional: Negative for activity change, appetite change, fever and unexpected weight change.   HENT: Negative for congestion and sore throat.    Respiratory: Positive for shortness of  breath (exertional). Negative for cough, chest tightness and wheezing.    Cardiovascular: Negative for chest pain, palpitations and leg swelling.   Gastrointestinal: Negative for abdominal pain.   Genitourinary: Negative for difficulty urinating.   Musculoskeletal: Positive for gait problem.   Neurological: Positive for weakness. Negative for dizziness and light-headedness.   Psychiatric/Behavioral: Negative for dysphoric mood. The patient is not nervous/anxious.        Lab Results   Component Value Date    WBC 7.00 04/03/2019    HGB 12.6 04/03/2019    HCT 38.4 04/03/2019     04/03/2019    CHOL 127 04/01/2019    TRIG 119 04/01/2019    HDL 45 04/01/2019    ALT 12 04/03/2019    AST 16 04/03/2019     04/03/2019    K 4.7 04/03/2019     04/03/2019    CREATININE 1.1 04/03/2019    BUN 16 04/03/2019    CO2 29 04/03/2019    TSH 9.977 (H) 04/01/2019    INR 1.0 06/07/2018    HGBA1C 6.0 (H) 04/01/2019       Objective:      Physical Exam   Constitutional: She is oriented to person, place, and time. She appears well-developed and well-nourished.   Obese body habitus    Cardiovascular: Normal rate, regular rhythm and normal heart sounds.   Pulmonary/Chest: Effort normal and breath sounds normal. She has no wheezes.   Musculoskeletal:   Shuffling unsteady gait, ambulating with Rolator walker    Neurological: She is alert and oriented to person, place, and time.   Skin: Skin is warm and dry.   Trace edema present to bilateral lower extremities    Psychiatric: She has a normal mood and affect.   Nursing note and vitals reviewed.      Assessment:       1. Diabetes mellitus without complication    2. Depression, unspecified depression type    3. Hypertension associated with diabetes    4. Hyperlipidemia associated with type 2 diabetes mellitus    5. Acquired hypothyroidism    6. Seizure disorder    7. CKD (chronic kidney disease) stage 3, GFR 30-59 ml/min    8. Severe obesity (BMI 35.0-35.9 with comorbidity)    9.  "Debility    10. Frequent falls (possibly related to polypharmacy)    11. JUAN (obstructive sleep apnea)    12. SOB (shortness of breath) on exertion        Plan:       Maggy was seen today for shortness of breath.    Diagnoses and all orders for this visit:    Diabetes mellitus without complication  A1C has greatly improved since 3 months ago, currently at goal  Stable condition.  Continue current medications.  Will adjust based on lab findings or if condition changes.    Hypertension associated with diabetes  -     CBC auto differential; Future  -     Comprehensive metabolic panel; Future  Controlled on current drug regimen    Hyperlipidemia associated with type 2 diabetes mellitus  -     Lipid panel; Future  Stable condition.  Continue current medications.  Will adjust based on lab findings or if condition changes.    Acquired hypothyroidism  Uncontrolled. Not compliant with medications  Continue same dose, urged compliance Discussion on how and when to take medications. Scheduled to repeat labs end of May for endocrine    Seizure disorder  Continue under the care of neurology    CKD (chronic kidney disease) stage 3, GFR 30-59 ml/min  Stable and chronic. Will continue to monitor q3-6 months and control chronic conditions as optimally as possible to preserve function.    Severe obesity (BMI 35.0-35.9 with comorbidity)  Counseled patient on his ideal body weight, health consequences of being obese and current recommendations including weekly exercise and a heart healthy diet.  Current BMI is:Estimated body mass index is 36.53 kg/m² as calculated from the following:    Height as of this encounter: 5' 1" (1.549 m).    Weight as of this encounter: 87.7 kg (193 lb 5.5 oz)..  Patient is aware that ideal BMI < 25 or Weight in (lb) to have BMI = 25: 132.    Debility  -     Ambulatory Referral to Physical/Occupational Therapy    Frequent falls (possibly related to polypharmacy)  -     Ambulatory Referral to " Physical/Occupational Therapy  Discussed fall precautions, always ambulate with assistive device     JUAN (obstructive sleep apnea)  Encouraged compliance with CPAP to help with SOB at night  If no improvement, return to clinic    SOB (shortness of breath) on exertion  ED note reviewed  PT to assist with debility- suspect physical deconditioning is a factor   Encouraged patient to return to clinic if new symptoms develop, SOB becomes worse or occurs at rest    Patient readiness: acceptance and barriers:none    During the course of the visit the patient was educated and counseled about the following:     Diabetes:  Discussed general issues about diabetes pathophysiology and management.  Hypertension:   Medication: no change.    Goals: Diabetes: Maintain Hemoglobin A1C below 7 and Hypertension: Reduce Blood Pressure    Did patient meet goals/outcomes: Yes    The following self management tools provided: none    Patient Instructions (the written plan) was given to the patient/family.     Time spent with patient: 30 minutes    Barriers to medications present (no )    Adverse reactions to current medications (no)    Over the counter medications reviewed (Yes)    F/U as scheduled with Dr. Abbasi fasting labs prior    initiation of breastfeeding/breast milk feeding

## 2021-12-02 NOTE — CHART NOTE - NSCHARTNOTEFT_GEN_A_CORE
I saw the patient at bed side with a senior resident. The patient passed 100cc clot. After uterine massage the patient passed 350 cc clots. No active bleeding.   Bedside sono at this point showed thin uterine endometrium, with some clots in the cervix and vagina. The patient is stable and asymptomatic.   Placenta was examined and found to be intact.   The patient has a history of PPH in prior delivery and she will be monitored closely. No active bleeding currently.   TXA and Methergine series will be given.  CBC in AM.   Attending is aware and in agreement.

## 2021-12-02 NOTE — PATIENT PROFILE OB - FALL HARM RISK - UNIVERSAL INTERVENTIONS
Bed in lowest position, wheels locked, appropriate side rails in place/Call bell, personal items and telephone in reach/Instruct patient to call for assistance before getting out of bed or chair/Non-slip footwear when patient is out of bed/Mars to call system/Physically safe environment - no spills, clutter or unnecessary equipment/Purposeful Proactive Rounding/Room/bathroom lighting operational, light cord in reach

## 2021-12-03 LAB
BASOPHILS # BLD AUTO: 0.01 K/UL — SIGNIFICANT CHANGE UP (ref 0–0.2)
BASOPHILS NFR BLD AUTO: 0.1 % — SIGNIFICANT CHANGE UP (ref 0–2)
COVID-19 SPIKE DOMAIN AB INTERP: POSITIVE
COVID-19 SPIKE DOMAIN ANTIBODY RESULT: >250 U/ML — HIGH
EOSINOPHIL # BLD AUTO: 0.02 K/UL — SIGNIFICANT CHANGE UP (ref 0–0.5)
EOSINOPHIL NFR BLD AUTO: 0.2 % — SIGNIFICANT CHANGE UP (ref 0–6)
HCT VFR BLD CALC: 31.3 % — LOW (ref 34.5–45)
HGB BLD-MCNC: 10.4 G/DL — LOW (ref 11.5–15.5)
IMM GRANULOCYTES NFR BLD AUTO: 0.5 % — SIGNIFICANT CHANGE UP (ref 0–1.5)
LYMPHOCYTES # BLD AUTO: 1.59 K/UL — SIGNIFICANT CHANGE UP (ref 1–3.3)
LYMPHOCYTES # BLD AUTO: 14 % — SIGNIFICANT CHANGE UP (ref 13–44)
MCHC RBC-ENTMCNC: 31.7 PG — SIGNIFICANT CHANGE UP (ref 27–34)
MCHC RBC-ENTMCNC: 33.2 GM/DL — SIGNIFICANT CHANGE UP (ref 32–36)
MCV RBC AUTO: 95.4 FL — SIGNIFICANT CHANGE UP (ref 80–100)
MONOCYTES # BLD AUTO: 0.77 K/UL — SIGNIFICANT CHANGE UP (ref 0–0.9)
MONOCYTES NFR BLD AUTO: 6.8 % — SIGNIFICANT CHANGE UP (ref 2–14)
NEUTROPHILS # BLD AUTO: 8.87 K/UL — HIGH (ref 1.8–7.4)
NEUTROPHILS NFR BLD AUTO: 78.4 % — HIGH (ref 43–77)
NRBC # BLD: 0 /100 WBCS — SIGNIFICANT CHANGE UP (ref 0–0)
PLATELET # BLD AUTO: 114 K/UL — LOW (ref 150–400)
RBC # BLD: 3.28 M/UL — LOW (ref 3.8–5.2)
RBC # FLD: 12.9 % — SIGNIFICANT CHANGE UP (ref 10.3–14.5)
SARS-COV-2 IGG+IGM SERPL QL IA: >250 U/ML — HIGH
SARS-COV-2 IGG+IGM SERPL QL IA: POSITIVE
T PALLIDUM AB TITR SER: NEGATIVE — SIGNIFICANT CHANGE UP
WBC # BLD: 11.32 K/UL — HIGH (ref 3.8–10.5)
WBC # FLD AUTO: 11.32 K/UL — HIGH (ref 3.8–10.5)

## 2021-12-03 RX ORDER — IBUPROFEN 200 MG
600 TABLET ORAL EVERY 6 HOURS
Refills: 0 | Status: DISCONTINUED | OUTPATIENT
Start: 2021-12-03 | End: 2021-12-04

## 2021-12-03 RX ADMIN — Medication 0.2 MILLIGRAM(S): at 14:41

## 2021-12-03 RX ADMIN — Medication 0.2 MILLIGRAM(S): at 17:48

## 2021-12-03 RX ADMIN — Medication 600 MILLIGRAM(S): at 17:48

## 2021-12-03 RX ADMIN — Medication 975 MILLIGRAM(S): at 08:40

## 2021-12-03 RX ADMIN — Medication 600 MILLIGRAM(S): at 18:40

## 2021-12-03 RX ADMIN — Medication 600 MILLIGRAM(S): at 13:00

## 2021-12-03 RX ADMIN — Medication 975 MILLIGRAM(S): at 21:32

## 2021-12-03 RX ADMIN — Medication 0.2 MILLIGRAM(S): at 03:35

## 2021-12-03 RX ADMIN — Medication 975 MILLIGRAM(S): at 08:38

## 2021-12-03 RX ADMIN — Medication 975 MILLIGRAM(S): at 03:36

## 2021-12-03 RX ADMIN — Medication 600 MILLIGRAM(S): at 23:34

## 2021-12-03 RX ADMIN — Medication 0.2 MILLIGRAM(S): at 06:54

## 2021-12-03 RX ADMIN — Medication 600 MILLIGRAM(S): at 12:31

## 2021-12-03 RX ADMIN — Medication 0.2 MILLIGRAM(S): at 09:45

## 2021-12-03 RX ADMIN — Medication 975 MILLIGRAM(S): at 21:02

## 2021-12-03 NOTE — PROGRESS NOTE ADULT - ASSESSMENT
A/P 31y s/p , receiving methergine series, s/p TXA, PPD #1  , stable, meeting postpartum milestones   - Pain: well controlled on oral pain meds  - GI: Tolerating regular diet  - : urinating without difficulty/pain  -DVT prophylaxis: ambulating frequently  -Dispo: PPD 2, unless otherwise specified   A/P 31y s/p , c/b PPH, receiving methergine series, s/p TXA, PPD #1  , stable, meeting postpartum milestones   - Pain: well controlled on oral pain meds  - GI: Tolerating regular diet  - : Erickson in place to be removed this AM, f/u TOV in PM  -DVT prophylaxis: ambulating frequently  -Dispo: PPD 2, unless otherwise specified

## 2021-12-03 NOTE — PROVIDER CONTACT NOTE (OTHER) - ASSESSMENT
Patient CBC was drawn, Patient was assisted to the restroom, voided 500cc, passed 2 clots while on toilet. Fundus firm, no other clots expelled. Temp 36.8, HR 71, 134/93, 17 and 98%
VS stable, Fundus firm at umbilius

## 2021-12-03 NOTE — PROVIDER CONTACT NOTE (OTHER) - BACKGROUND
Pt is a 32 y/o F,  @ 40.5 Wk.  @ 20;45
 21 at 20:45 at 40.5 weeks,  with history of PPH. EBL was 500, S/P TXA and Methergine IM. Is currently on Methergine Series, S/P Erickson was removed 0600AM, Patient voided.

## 2021-12-03 NOTE — LACTATION INITIAL EVALUATION - LACTATION INTERVENTIONS
initiate/review safe skin-to-skin/initiate/review hand expression/initiate/review techniques for position and latch/review techniques to manage sore nipples/engorgement/reviewed components of an effective feeding and at least 8 effective feedings per day required/reviewed importance of monitoring infant diapers, the breastfeeding log, and minimum output each day/reviewed feeding on demand/by cue at least 8 times a day/reviewed indications of inadequate milk transfer that would require supplementation

## 2021-12-03 NOTE — PROGRESS NOTE ADULT - SUBJECTIVE AND OBJECTIVE BOX
Patient evaluated at bedside this morning, resting comfortable in bed, no acute events overnight.  She reports pain is well controlled with tylenol and motrin  She denies headache, dizziness, chest pain, palpitations, shortness of breath, nausea, vomiting, heavy vaginal bleeding or perineal discomfort. Reports decrease in amount of vaginal bleeding and denies clots.  She has been ambulating without assistance, voiding spontaneously, and is breastfeeding.   Tolerating food well, without nausea/vomit.  Passing flatus.     Physical Exam:  T(C): 36.8 (12-03-21 @ 06:06), Max: 36.8 (12-03-21 @ 06:06)  HR: 78 (12-03-21 @ 06:06) (78 - 113)  BP: 114/75 (12-03-21 @ 06:06) (96/81 - 126/79)  RR: 16 (12-03-21 @ 06:06) (16 - 18)  SpO2: 99% (12-03-21 @ 06:06) (99% - 100%)    GA: NAD, A&O x 3  Pulm: normal respiratory rate  Abd: + BS, soft, nontender, nondistended, no rebound or guarding, uterus firm at midline and 2  fb below umbilicus  Extremities: no swelling or calf tenderness                          12.1   9.57  )-----------( 141      ( 02 Dec 2021 19:39 )             36.8           acetaminophen     Tablet .. 975 milliGRAM(s) Oral <User Schedule>  benzocaine 20%/menthol 0.5% Spray 1 Spray(s) Topical every 6 hours PRN  dibucaine 1% Ointment 1 Application(s) Topical every 6 hours PRN  diphenhydrAMINE 25 milliGRAM(s) Oral every 6 hours PRN  diphtheria/tetanus/pertussis (acellular) Vaccine (ADAcel) 0.5 milliLiter(s) IntraMuscular once  hydrocortisone 1% Cream 1 Application(s) Topical every 6 hours PRN  ibuprofen  Tablet. 600 milliGRAM(s) Oral every 6 hours  lanolin Ointment 1 Application(s) Topical every 6 hours PRN  magnesium hydroxide Suspension 30 milliLiter(s) Oral two times a day PRN  methylergonovine 0.2 milliGRAM(s) Oral every 4 hours  oxytocin Infusion 333.333 milliUNIT(s)/Min IV Continuous <Continuous>  pramoxine 1%/zinc 5% Cream 1 Application(s) Topical every 4 hours PRN  prenatal multivitamin 1 Tablet(s) Oral daily  simethicone 80 milliGRAM(s) Chew every 4 hours PRN  sodium chloride 0.9% lock flush 3 milliLiter(s) IV Push every 8 hours  witch hazel Pads 1 Application(s) Topical every 4 hours PRN   Patient evaluated at bedside this morning, resting comfortable in bed, no acute events overnight.  She reports pain is well controlled with tylenol and motrin  She denies headache, dizziness, chest pain, palpitations, shortness of breath, nausea, vomiting, heavy vaginal bleeding or perineal discomfort. Reports decrease in amount of vaginal bleeding and denies clots.  She has been ambulating without assistance and is breastfeeding. Erickson in place.  Tolerating food well, without nausea/vomit.  Passing flatus.     Physical Exam:  T(C): 36.8 (12-03-21 @ 06:06), Max: 36.8 (12-03-21 @ 06:06)  HR: 78 (12-03-21 @ 06:06) (78 - 113)  BP: 114/75 (12-03-21 @ 06:06) (96/81 - 126/79)  RR: 16 (12-03-21 @ 06:06) (16 - 18)  SpO2: 99% (12-03-21 @ 06:06) (99% - 100%)    GA: NAD, A&O x 3  Pulm: normal respiratory rate  Abd: + BS, soft, nontender, nondistended, no rebound or guarding, uterus firm at midline and 2  fb below umbilicus  Extremities: no swelling or calf tenderness                          12.1   9.57  )-----------( 141      ( 02 Dec 2021 19:39 )             36.8           acetaminophen     Tablet .. 975 milliGRAM(s) Oral <User Schedule>  benzocaine 20%/menthol 0.5% Spray 1 Spray(s) Topical every 6 hours PRN  dibucaine 1% Ointment 1 Application(s) Topical every 6 hours PRN  diphenhydrAMINE 25 milliGRAM(s) Oral every 6 hours PRN  diphtheria/tetanus/pertussis (acellular) Vaccine (ADAcel) 0.5 milliLiter(s) IntraMuscular once  hydrocortisone 1% Cream 1 Application(s) Topical every 6 hours PRN  ibuprofen  Tablet. 600 milliGRAM(s) Oral every 6 hours  lanolin Ointment 1 Application(s) Topical every 6 hours PRN  magnesium hydroxide Suspension 30 milliLiter(s) Oral two times a day PRN  methylergonovine 0.2 milliGRAM(s) Oral every 4 hours  oxytocin Infusion 333.333 milliUNIT(s)/Min IV Continuous <Continuous>  pramoxine 1%/zinc 5% Cream 1 Application(s) Topical every 4 hours PRN  prenatal multivitamin 1 Tablet(s) Oral daily  simethicone 80 milliGRAM(s) Chew every 4 hours PRN  sodium chloride 0.9% lock flush 3 milliLiter(s) IV Push every 8 hours  witch hazel Pads 1 Application(s) Topical every 4 hours PRN

## 2021-12-03 NOTE — LACTATION INITIAL EVALUATION - NS LACT CON REASON FOR REQ
40.5wk gestation baby, about 19 hrs old at this time. Placed the baby STS with the mother while I provided breastfeeding education and explained normal  behaviour and the milk production feedback system. Assisted with positioning in a football hold on the left breast and taught latch strategies. Baby was able to latch deeply and is feeding well, rhythmically sucking between short pauses of rest. Mother to continue with STS when possible, room-in, and feed as per cues at least 8-12x/ day. To f/u as needed./c/o sore, painful nipples/nipple damage/inverted/flat nipples/multiparous mom

## 2021-12-03 NOTE — PROVIDER CONTACT NOTE (OTHER) - ACTION/TREATMENT ORDERED:
Dr. Diego called to the bedside and massaged the fundus and expressed more clots. TXA & methergine ordered and given
Nothing to do for now. q4 vitals ongoing with Methergine series.     MD Najma PGY-1 came to assess patient at bedside.

## 2021-12-03 NOTE — CHART NOTE - NSCHARTNOTEFT_GEN_A_CORE
Patient assessed at bedside following passage of clots.  Patient stood up to void this AM with assistance of RN. She voided 500 cc and passed 400 cc of clots; they were dark red, no fresh blood noted. This was the first time the patient stood since arriving in her postpartum room yesterday evening. Patient reports feeling well, denies dizziness, SOB, HA, palpitations.    PE  Vital Signs Last 24 Hrs  T(C): 36.8 (03 Dec 2021 10:26), Max: 36.8 (03 Dec 2021 06:06)  T(F): 98.2 (03 Dec 2021 10:26), Max: 98.3 (03 Dec 2021 06:06)  HR: 71 (03 Dec 2021 10:) (71 - 113)  BP: 134/93 (03 Dec 2021 10:26) (96/81 - 134/93)  BP(mean): --  RR: 17 (03 Dec 2021 10:26) (16 - 18)  SpO2: 98% (03 Dec 2021 10:26) (98% - 100%)  Gen: well appearing  Abd: soft, nontender, fundus firm at midline 2 cm below umbilicus, no pain on palpation    A/P  32 yo  s/p  on  c/b PPH (total  cc+400 cc clots this II=520 cc) s/p TXA, currently on methergine series  -patient clinically and hemodynamically stable  -patient receiving methergine series  -will f/u AM CBC  -AM CBC   -continue to monitor    D/W Dr. Main and Dr. Painter

## 2021-12-04 ENCOUNTER — TRANSCRIPTION ENCOUNTER (OUTPATIENT)
Age: 31
End: 2021-12-04

## 2021-12-04 VITALS
SYSTOLIC BLOOD PRESSURE: 117 MMHG | TEMPERATURE: 98 F | HEART RATE: 93 BPM | DIASTOLIC BLOOD PRESSURE: 75 MMHG | RESPIRATION RATE: 18 BRPM | OXYGEN SATURATION: 98 %

## 2021-12-04 LAB
HCT VFR BLD CALC: 26.8 % — LOW (ref 34.5–45)
HGB BLD-MCNC: 8.8 G/DL — LOW (ref 11.5–15.5)
MCHC RBC-ENTMCNC: 31.8 PG — SIGNIFICANT CHANGE UP (ref 27–34)
MCHC RBC-ENTMCNC: 32.8 GM/DL — SIGNIFICANT CHANGE UP (ref 32–36)
MCV RBC AUTO: 96.8 FL — SIGNIFICANT CHANGE UP (ref 80–100)
NRBC # BLD: 0 /100 WBCS — SIGNIFICANT CHANGE UP (ref 0–0)
PLATELET # BLD AUTO: 107 K/UL — LOW (ref 150–400)
RBC # BLD: 2.77 M/UL — LOW (ref 3.8–5.2)
RBC # FLD: 13.1 % — SIGNIFICANT CHANGE UP (ref 10.3–14.5)
WBC # BLD: 7.78 K/UL — SIGNIFICANT CHANGE UP (ref 3.8–10.5)
WBC # FLD AUTO: 7.78 K/UL — SIGNIFICANT CHANGE UP (ref 3.8–10.5)

## 2021-12-04 PROCEDURE — 85025 COMPLETE CBC W/AUTO DIFF WBC: CPT

## 2021-12-04 PROCEDURE — 86769 SARS-COV-2 COVID-19 ANTIBODY: CPT

## 2021-12-04 PROCEDURE — 86900 BLOOD TYPING SEROLOGIC ABO: CPT

## 2021-12-04 PROCEDURE — 86901 BLOOD TYPING SEROLOGIC RH(D): CPT

## 2021-12-04 PROCEDURE — 86850 RBC ANTIBODY SCREEN: CPT

## 2021-12-04 PROCEDURE — 36415 COLL VENOUS BLD VENIPUNCTURE: CPT

## 2021-12-04 PROCEDURE — 59050 FETAL MONITOR W/REPORT: CPT

## 2021-12-04 PROCEDURE — 86780 TREPONEMA PALLIDUM: CPT

## 2021-12-04 PROCEDURE — 85027 COMPLETE CBC AUTOMATED: CPT

## 2021-12-04 RX ORDER — IBUPROFEN 200 MG
1 TABLET ORAL
Qty: 0 | Refills: 0 | DISCHARGE
Start: 2021-12-04

## 2021-12-04 RX ORDER — ACETAMINOPHEN 500 MG
3 TABLET ORAL
Qty: 0 | Refills: 0 | DISCHARGE
Start: 2021-12-04

## 2021-12-04 RX ADMIN — Medication 975 MILLIGRAM(S): at 04:18

## 2021-12-04 RX ADMIN — Medication 600 MILLIGRAM(S): at 00:04

## 2021-12-04 RX ADMIN — Medication 600 MILLIGRAM(S): at 05:43

## 2021-12-04 RX ADMIN — Medication 975 MILLIGRAM(S): at 12:15

## 2021-12-04 RX ADMIN — Medication 975 MILLIGRAM(S): at 03:48

## 2021-12-04 RX ADMIN — Medication 975 MILLIGRAM(S): at 11:27

## 2021-12-04 NOTE — DISCHARGE NOTE OB - PATIENT PORTAL LINK FT
You can access the FollowMyHealth Patient Portal offered by [facility] by registering at the following website: [email or web address]. By joining [facility]’s FollowMyHealth portal, you will also be able to view your health information using other applications (apps) compatible with our system.

## 2021-12-04 NOTE — DISCHARGE NOTE OB - CARE PROVIDER_API CALL
Everton Painter  OBSTETRICS AND GYNECOLOGY  [address] [place] [address]  Phone: [phone]  Fax: [phone]  Follow Up Time:

## 2021-12-04 NOTE — DISCHARGE NOTE OB - NS MD DC FALL RISK RISK
For information on Fall & Injury Prevention, visit: [email or web address] OR  [email or web address] OR  https://www.cdc.gov/steadi/patient.html

## 2021-12-04 NOTE — DISCHARGE NOTE OB - HOSPITAL COURSE
Patient admitted to L&D for vaginal delivery.  Intrapartum course uneventful.  Postpartum course complicated by postpartum hemorrhage; patient received a PO methergine series for 24 hours and was discharged clinically and hemodynamically stable.  Discharged home on PP Day #2.

## 2021-12-04 NOTE — PROGRESS NOTE ADULT - ASSESSMENT
A/P 31y s/p  c/b PPH, PPD #2  , stable, meeting postpartum milestones   - PPH: no active bleeding, lochia is wnl  - Pain: well controlled on OPM  - GI: Tolerating regular diet  - : urinating without difficulty/pain  -DVT prophylaxis: ambulating frequently  -Dispo: PPD 2, unless otherwise specified

## 2021-12-04 NOTE — PROGRESS NOTE ADULT - SUBJECTIVE AND OBJECTIVE BOX
Patient evaluated at bedside this morning, resting comfortable in bed, no acute events overnight.  She reports pain is well controlled with tylenol and motrin  She denies headache, dizziness, chest pain, palpitations, shortness of breath, nausea, vomiting, heavy vaginal bleeding or perineal discomfort. Reports decrease in amount of vaginal bleeding and denies clots.  She has been ambulating without assistance, voiding spontaneously.  Tolerating food well, without nausea/vomit.  Passing flatus.     Physical Exam:  T(C): 36.7 (12-04-21 @ 06:00), Max: 36.8 (12-04-21 @ 02:00)  HR: 93 (12-04-21 @ 06:00) (93 - 101)  BP: 117/75 (12-04-21 @ 06:00) (117/75 - 123/81)  RR: 18 (12-04-21 @ 06:00) (18 - 18)  SpO2: 98% (12-04-21 @ 06:00) (98% - 98%)    GA: NAD, A&O x 3  Abd: + BS, soft, nontender, nondistended, no rebound or guarding, uterus firm at midline and 2  fb below umbilicus  Perineum: normal lochia, intact, healing well, no hematoma  Extremities: no swelling or calf tenderness                          8.8    7.78  )-----------( 107      ( 04 Dec 2021 06:02 )             26.8           acetaminophen     Tablet .. 975 milliGRAM(s) Oral <User Schedule>  benzocaine 20%/menthol 0.5% Spray 1 Spray(s) Topical every 6 hours PRN  dibucaine 1% Ointment 1 Application(s) Topical every 6 hours PRN  diphenhydrAMINE 25 milliGRAM(s) Oral every 6 hours PRN  diphtheria/tetanus/pertussis (acellular) Vaccine (ADAcel) 0.5 milliLiter(s) IntraMuscular once  hydrocortisone 1% Cream 1 Application(s) Topical every 6 hours PRN  ibuprofen  Tablet. 600 milliGRAM(s) Oral every 6 hours  lanolin Ointment 1 Application(s) Topical every 6 hours PRN  magnesium hydroxide Suspension 30 milliLiter(s) Oral two times a day PRN  oxytocin Infusion 333.333 milliUNIT(s)/Min IV Continuous <Continuous>  pramoxine 1%/zinc 5% Cream 1 Application(s) Topical every 4 hours PRN  prenatal multivitamin 1 Tablet(s) Oral daily  simethicone 80 milliGRAM(s) Chew every 4 hours PRN  sodium chloride 0.9% lock flush 3 milliLiter(s) IV Push every 8 hours  witch hazel Pads 1 Application(s) Topical every 4 hours PRN

## 2021-12-07 DIAGNOSIS — Z86.19 PERSONAL HISTORY OF OTHER INFECTIOUS AND PARASITIC DISEASES: ICD-10-CM

## 2021-12-07 DIAGNOSIS — M41.9 SCOLIOSIS, UNSPECIFIED: ICD-10-CM

## 2021-12-07 DIAGNOSIS — Z34.83 ENCOUNTER FOR SUPERVISION OF OTHER NORMAL PREGNANCY, THIRD TRIMESTER: ICD-10-CM

## 2021-12-07 DIAGNOSIS — Z88.8 ALLERGY STATUS TO OTHER DRUGS, MEDICAMENTS AND BIOLOGICAL SUBSTANCES: ICD-10-CM

## 2021-12-07 DIAGNOSIS — Z88.1 ALLERGY STATUS TO OTHER ANTIBIOTIC AGENTS STATUS: ICD-10-CM

## 2021-12-07 DIAGNOSIS — O48.0 POST-TERM PREGNANCY: ICD-10-CM

## 2021-12-07 DIAGNOSIS — Z28.09 IMMUNIZATION NOT CARRIED OUT BECAUSE OF OTHER CONTRAINDICATION: ICD-10-CM

## 2021-12-07 DIAGNOSIS — Z3A.40 40 WEEKS GESTATION OF PREGNANCY: ICD-10-CM

## 2024-05-24 NOTE — PATIENT PROFILE OB - BILL OF RIGHTS/ADMISSION INFORMATION PROVIDED TO:
----- Message from Renae Murillo MD sent at 5/23/2024 11:44 PM EDT -----  Please call the patient regarding thyroid ultrasound-stable, will discuss further on follow-up visit   Patient

## 2024-07-29 NOTE — DISCHARGE NOTE OB - REST! DO NOT DO HEAVY HOUSEWORK, LIFTING OR STRENOUS EXERCISE FOR TWO WEEKS
Called patient to remind and review MRI breast biopsy:  call went to voicemail.  Left brief information and location of biopsy:  left my number if she had questions.     Statement Selected